# Patient Record
Sex: MALE | Race: BLACK OR AFRICAN AMERICAN | NOT HISPANIC OR LATINO | Employment: UNEMPLOYED | URBAN - METROPOLITAN AREA
[De-identification: names, ages, dates, MRNs, and addresses within clinical notes are randomized per-mention and may not be internally consistent; named-entity substitution may affect disease eponyms.]

---

## 2023-10-24 ENCOUNTER — APPOINTMENT (OUTPATIENT)
Dept: RADIOLOGY | Facility: HOSPITAL | Age: 63
End: 2023-10-24
Payer: MEDICAID

## 2023-10-24 ENCOUNTER — HOSPITAL ENCOUNTER (EMERGENCY)
Facility: HOSPITAL | Age: 63
Discharge: HOME | End: 2023-10-24
Payer: MEDICAID

## 2023-10-24 VITALS
SYSTOLIC BLOOD PRESSURE: 125 MMHG | RESPIRATION RATE: 18 BRPM | WEIGHT: 168.65 LBS | TEMPERATURE: 97.7 F | DIASTOLIC BLOOD PRESSURE: 85 MMHG | HEIGHT: 72 IN | HEART RATE: 67 BPM | OXYGEN SATURATION: 98 % | BODY MASS INDEX: 22.84 KG/M2

## 2023-10-24 DIAGNOSIS — S59.912A: Primary | ICD-10-CM

## 2023-10-24 PROCEDURE — 73090 X-RAY EXAM OF FOREARM: CPT | Mod: LEFT SIDE | Performed by: RADIOLOGY

## 2023-10-24 PROCEDURE — 99283 EMERGENCY DEPT VISIT LOW MDM: CPT | Mod: 25 | Performed by: REGISTERED NURSE

## 2023-10-24 PROCEDURE — 90715 TDAP VACCINE 7 YRS/> IM: CPT | Performed by: REGISTERED NURSE

## 2023-10-24 PROCEDURE — 73090 X-RAY EXAM OF FOREARM: CPT | Mod: LT,FY

## 2023-10-24 PROCEDURE — 90471 IMMUNIZATION ADMIN: CPT | Performed by: REGISTERED NURSE

## 2023-10-24 PROCEDURE — 2500000004 HC RX 250 GENERAL PHARMACY W/ HCPCS (ALT 636 FOR OP/ED): Performed by: REGISTERED NURSE

## 2023-10-24 PROCEDURE — 99284 EMERGENCY DEPT VISIT MOD MDM: CPT

## 2023-10-24 RX ORDER — DOXYCYCLINE 100 MG/1
100 CAPSULE ORAL 2 TIMES DAILY
Qty: 10 CAPSULE | Refills: 0 | Status: SHIPPED | OUTPATIENT
Start: 2023-10-24 | End: 2023-10-29

## 2023-10-24 RX ADMIN — TETANUS TOXOID, REDUCED DIPHTHERIA TOXOID AND ACELLULAR PERTUSSIS VACCINE, ADSORBED 0.5 ML: 5; 2.5; 8; 8; 2.5 SUSPENSION INTRAMUSCULAR at 17:12

## 2023-10-24 ASSESSMENT — LIFESTYLE VARIABLES
HAVE YOU EVER FELT YOU SHOULD CUT DOWN ON YOUR DRINKING: NO
REASON UNABLE TO ASSESS: YES
EVER HAD A DRINK FIRST THING IN THE MORNING TO STEADY YOUR NERVES TO GET RID OF A HANGOVER: NO
EVER FELT BAD OR GUILTY ABOUT YOUR DRINKING: NO
HAVE PEOPLE ANNOYED YOU BY CRITICIZING YOUR DRINKING: NO

## 2023-10-24 ASSESSMENT — PAIN - FUNCTIONAL ASSESSMENT: PAIN_FUNCTIONAL_ASSESSMENT: 0-10

## 2023-10-24 ASSESSMENT — COLUMBIA-SUICIDE SEVERITY RATING SCALE - C-SSRS
1. IN THE PAST MONTH, HAVE YOU WISHED YOU WERE DEAD OR WISHED YOU COULD GO TO SLEEP AND NOT WAKE UP?: NO
6. HAVE YOU EVER DONE ANYTHING, STARTED TO DO ANYTHING, OR PREPARED TO DO ANYTHING TO END YOUR LIFE?: NO
2. HAVE YOU ACTUALLY HAD ANY THOUGHTS OF KILLING YOURSELF?: NO

## 2023-10-24 ASSESSMENT — PAIN SCALES - GENERAL: PAINLEVEL_OUTOF10: 8

## 2023-10-24 NOTE — ED PROVIDER NOTES
"HPI   Chief Complaint   Patient presents with    Wound Check     \"I have a bamboo splinter in my left arm.  It broke off.'       62-year-old male who denies significant past medical history presents emergency department with report that a piece of bamboo broke off into his left forearm.  Patient tells me that approximately 2 PM today he was working out in his yard and somehow p.m. Mane stick penetrated his left forearm.  Patient states he attempted to pull it out himself however he feels that it broke off in his arm.  Patient is unsure of his last tetanus shot.  Patient denies any numbness or tingling, denies any fever or chills.  Additionally patient denies any chest pain, shortness of breath, nausea vomiting, abdominal pain, itchiness or lightheadedness, headache, weakness, urinary symptoms.        History provided by:  Patient   used: No                        [place] Coma Scale Score: 15                  Patient History   History reviewed. No pertinent past medical history.  History reviewed. No pertinent surgical history.  No family history on file.  Social History     Tobacco Use    Smoking status: Every Day     Types: Cigarettes    Smokeless tobacco: Never   Vaping Use    Vaping Use: Never used   Substance Use Topics    Alcohol use: Never    Drug use: Never     Physical Exam   ED Triage Vitals [10/24/23 1532]   Temp Heart Rate Resp BP   36.5 °C (97.7 °F) 67 18 125/85      SpO2 Temp Source Heart Rate Source Patient Position   98 % Tympanic Monitor Sitting      BP Location FiO2 (%)     -- --       Physical Exam    ED Course & MDM   Diagnoses as of 10/24/23 1901   Soft tissue injury of left forearm, initial encounter       Medical Decision Making  62-year-old male who denies significant past medical history presents emergency department with report that a piece of bamboo broke off into his left forearm.  Patient tells me that approximately 2 PM today he was working out in his yard and " somehow p.m. Mane stick penetrated his left forearm.  Patient states he attempted to pull it out himself however he feels that it broke off in his arm.  Patient is unsure of his last tetanus shot.  Patient denies any numbness or tingling, denies any fever or chills.  Additionally patient denies any chest pain, shortness of breath, nausea vomiting, abdominal pain, itchiness or lightheadedness, headache, weakness, urinary symptoms.  Patient seen examined emergency department; patient is healthy and nontoxic appearance does not appear in any acute distress.  Clinical exam significant for small raised area at distal end of the left forearm.  Patient does appear to have a possible penetration site and the area around it is swollen.  Will obtain imaging of left forearm to evaluate for foreign body.  We will also update patient's tetanus while he is here today.  Patient's imaging of left forearm did not display does retained foreign body.  I did go discussed this with patient.  Patient is adamantly screaming that there is a piece of bamboo in his arm.  We discussed that I was uncomfortable lancing in the area open just to explore.  We discussed the that the bamboo would work itself out.  I did start patient on oral antibiotics prophylactically.  Patient is visibly upset, bedside nurses present during discussion.  Patient discharged home in stable condition with prescription for antibiotics.        Procedure  Procedures     Calista Perry, SINDY-CNP  10/24/23 1902

## 2023-11-13 ENCOUNTER — APPOINTMENT (OUTPATIENT)
Dept: SURGERY | Facility: CLINIC | Age: 63
End: 2023-11-13
Payer: MEDICAID

## 2023-11-30 ENCOUNTER — OFFICE VISIT (OUTPATIENT)
Dept: SURGERY | Facility: CLINIC | Age: 63
End: 2023-11-30
Payer: MEDICAID

## 2023-11-30 VITALS
BODY MASS INDEX: 22.35 KG/M2 | DIASTOLIC BLOOD PRESSURE: 113 MMHG | HEART RATE: 73 BPM | HEIGHT: 72 IN | WEIGHT: 165 LBS | SYSTOLIC BLOOD PRESSURE: 148 MMHG

## 2023-11-30 DIAGNOSIS — M60.232: Primary | ICD-10-CM

## 2023-11-30 PROCEDURE — 99203 OFFICE O/P NEW LOW 30 MIN: CPT | Performed by: SURGERY

## 2023-11-30 ASSESSMENT — ENCOUNTER SYMPTOMS: WOUND: 1

## 2023-11-30 NOTE — PROGRESS NOTES
Subjective   Patient ID: Joshua Ferreira is a 63 y.o. male who presents for New Patient Visit.  HPI  63-year-old male suffered injury to left forearm from a piece of bamboo he states that still there was not removed in the ER soft tissue films negative some pain in the area but no neurologic deficits  Review of Systems   Skin:  Positive for rash and wound.   All other systems reviewed and are negative.      Objective   Physical Exam  Skin:     Comments: 1 x 1 cm nodule without purulence left forearm with some induration       Physical Exam  Constitutional:       Appearance: Normal appearance.   HENT:      Head: Normocephalic and atraumatic.      Mouth/Throat:      Pharynx: Oropharynx is clear.   Eyes:      Pupils: Pupils are equal, round, and reactive to light.   Cardiovascular:      Rate and Rhythm: Normal rate and regular rhythm.   Pulmonary:      Effort: Pulmonary effort is normal.      Breath sounds: Normal breath sounds.   Abdominal:      General: Abdomen is flat. Bowel sounds are normal.      Palpations: Abdomen is soft.   Musculoskeletal:         General: Normal range of motion.      Cervical back: Normal range of motion.   Skin:     General: Skin is warm.   Neurological:      General: No focal deficit present.      Mental Status: She is alert. Mental status is at baseline.   Psychiatric:         Mood and Affect: Mood normal.    Assessment/Plan   Foreign body left forearm discussed with the patient recommend exploration under local anesthetic in the office with removal need 45 minutes

## 2024-03-19 ENCOUNTER — HOSPITAL ENCOUNTER (EMERGENCY)
Facility: HOSPITAL | Age: 64
Discharge: OTHER INSTITUTION | End: 2024-03-20
Attending: INTERNAL MEDICINE
Payer: MEDICAID

## 2024-03-19 ENCOUNTER — APPOINTMENT (OUTPATIENT)
Dept: RADIOLOGY | Facility: HOSPITAL | Age: 64
End: 2024-03-19
Payer: MEDICAID

## 2024-03-19 DIAGNOSIS — S05.62XA: Primary | ICD-10-CM

## 2024-03-19 LAB
ANION GAP SERPL CALC-SCNC: 10 MMOL/L (ref 10–20)
BASOPHILS # BLD AUTO: 0.05 X10*3/UL (ref 0–0.1)
BASOPHILS NFR BLD AUTO: 0.8 %
BUN SERPL-MCNC: 12 MG/DL (ref 6–23)
CALCIUM SERPL-MCNC: 9 MG/DL (ref 8.6–10.3)
CHLORIDE SERPL-SCNC: 105 MMOL/L (ref 98–107)
CO2 SERPL-SCNC: 27 MMOL/L (ref 21–32)
CREAT SERPL-MCNC: 0.9 MG/DL (ref 0.5–1.3)
EGFRCR SERPLBLD CKD-EPI 2021: >90 ML/MIN/1.73M*2
EOSINOPHIL # BLD AUTO: 0.11 X10*3/UL (ref 0–0.7)
EOSINOPHIL NFR BLD AUTO: 1.7 %
ERYTHROCYTE [DISTWIDTH] IN BLOOD BY AUTOMATED COUNT: 13.9 % (ref 11.5–14.5)
GLUCOSE SERPL-MCNC: 85 MG/DL (ref 74–99)
HCT VFR BLD AUTO: 40.5 % (ref 41–52)
HGB BLD-MCNC: 13.7 G/DL (ref 13.5–17.5)
IMM GRANULOCYTES # BLD AUTO: 0.01 X10*3/UL (ref 0–0.7)
IMM GRANULOCYTES NFR BLD AUTO: 0.2 % (ref 0–0.9)
LYMPHOCYTES # BLD AUTO: 1.48 X10*3/UL (ref 1.2–4.8)
LYMPHOCYTES NFR BLD AUTO: 22.6 %
MCH RBC QN AUTO: 31 PG (ref 26–34)
MCHC RBC AUTO-ENTMCNC: 33.8 G/DL (ref 32–36)
MCV RBC AUTO: 92 FL (ref 80–100)
MONOCYTES # BLD AUTO: 0.46 X10*3/UL (ref 0.1–1)
MONOCYTES NFR BLD AUTO: 7 %
NEUTROPHILS # BLD AUTO: 4.45 X10*3/UL (ref 1.2–7.7)
NEUTROPHILS NFR BLD AUTO: 67.7 %
NRBC BLD-RTO: 0 /100 WBCS (ref 0–0)
PLATELET # BLD AUTO: 145 X10*3/UL (ref 150–450)
POTASSIUM SERPL-SCNC: 4 MMOL/L (ref 3.5–5.3)
RBC # BLD AUTO: 4.42 X10*6/UL (ref 4.5–5.9)
SODIUM SERPL-SCNC: 138 MMOL/L (ref 136–145)
WBC # BLD AUTO: 6.6 X10*3/UL (ref 4.4–11.3)

## 2024-03-19 PROCEDURE — 2500000004 HC RX 250 GENERAL PHARMACY W/ HCPCS (ALT 636 FOR OP/ED): Performed by: INTERNAL MEDICINE

## 2024-03-19 PROCEDURE — 36415 COLL VENOUS BLD VENIPUNCTURE: CPT | Performed by: PHYSICIAN ASSISTANT

## 2024-03-19 PROCEDURE — 99285 EMERGENCY DEPT VISIT HI MDM: CPT | Mod: 25

## 2024-03-19 PROCEDURE — 2500000001 HC RX 250 WO HCPCS SELF ADMINISTERED DRUGS (ALT 637 FOR MEDICARE OP): Performed by: INTERNAL MEDICINE

## 2024-03-19 PROCEDURE — 85025 COMPLETE CBC W/AUTO DIFF WBC: CPT | Performed by: PHYSICIAN ASSISTANT

## 2024-03-19 PROCEDURE — 80048 BASIC METABOLIC PNL TOTAL CA: CPT | Performed by: PHYSICIAN ASSISTANT

## 2024-03-19 PROCEDURE — 2500000001 HC RX 250 WO HCPCS SELF ADMINISTERED DRUGS (ALT 637 FOR MEDICARE OP): Performed by: PHYSICIAN ASSISTANT

## 2024-03-19 PROCEDURE — 96365 THER/PROPH/DIAG IV INF INIT: CPT | Mod: 59

## 2024-03-19 PROCEDURE — 70482 CT ORBIT/EAR/FOSSA W/O&W/DYE: CPT

## 2024-03-19 PROCEDURE — 2500000004 HC RX 250 GENERAL PHARMACY W/ HCPCS (ALT 636 FOR OP/ED)

## 2024-03-19 PROCEDURE — 2550000001 HC RX 255 CONTRASTS: Performed by: PHYSICIAN ASSISTANT

## 2024-03-19 PROCEDURE — 70482 CT ORBIT/EAR/FOSSA W/O&W/DYE: CPT | Performed by: RADIOLOGY

## 2024-03-19 RX ORDER — ACETAMINOPHEN 325 MG/1
650 TABLET ORAL ONCE
Status: COMPLETED | OUTPATIENT
Start: 2024-03-19 | End: 2024-03-19

## 2024-03-19 RX ORDER — TETRACAINE HYDROCHLORIDE 5 MG/ML
2 SOLUTION OPHTHALMIC ONCE
Status: COMPLETED | OUTPATIENT
Start: 2024-03-19 | End: 2024-03-19

## 2024-03-19 RX ORDER — CEFAZOLIN SODIUM 1 G/50ML
1 SOLUTION INTRAVENOUS ONCE
Status: COMPLETED | OUTPATIENT
Start: 2024-03-19 | End: 2024-03-19

## 2024-03-19 RX ORDER — OXYCODONE AND ACETAMINOPHEN 5; 325 MG/1; MG/1
1 TABLET ORAL ONCE
Status: COMPLETED | OUTPATIENT
Start: 2024-03-19 | End: 2024-03-19

## 2024-03-19 RX ADMIN — CEFAZOLIN SODIUM 1 G: 1 SOLUTION INTRAVENOUS at 23:19

## 2024-03-19 RX ADMIN — IOHEXOL 75 ML: 350 INJECTION, SOLUTION INTRAVENOUS at 20:21

## 2024-03-19 RX ADMIN — ACETAMINOPHEN 650 MG: 325 TABLET ORAL at 18:06

## 2024-03-19 RX ADMIN — TETRACAINE HYDROCHLORIDE 2 DROP: 5 SOLUTION OPHTHALMIC at 18:06

## 2024-03-19 RX ADMIN — FLUORESCEIN SODIUM 1 STRIP: 1 STRIP OPHTHALMIC at 18:06

## 2024-03-19 RX ADMIN — OXYCODONE HYDROCHLORIDE AND ACETAMINOPHEN 1 TABLET: 5; 325 TABLET ORAL at 22:53

## 2024-03-19 ASSESSMENT — PAIN DESCRIPTION - DESCRIPTORS: DESCRIPTORS: ACHING

## 2024-03-19 ASSESSMENT — PAIN DESCRIPTION - LOCATION: LOCATION: EYE

## 2024-03-19 ASSESSMENT — LIFESTYLE VARIABLES
EVER FELT BAD OR GUILTY ABOUT YOUR DRINKING: NO
HAVE YOU EVER FELT YOU SHOULD CUT DOWN ON YOUR DRINKING: NO
EVER HAD A DRINK FIRST THING IN THE MORNING TO STEADY YOUR NERVES TO GET RID OF A HANGOVER: NO
HAVE PEOPLE ANNOYED YOU BY CRITICIZING YOUR DRINKING: NO

## 2024-03-19 ASSESSMENT — VISUAL ACUITY
OD: 20/40
OS: 20/30

## 2024-03-19 ASSESSMENT — PAIN DESCRIPTION - PAIN TYPE: TYPE: ACUTE PAIN

## 2024-03-19 ASSESSMENT — COLUMBIA-SUICIDE SEVERITY RATING SCALE - C-SSRS
2. HAVE YOU ACTUALLY HAD ANY THOUGHTS OF KILLING YOURSELF?: NO
1. IN THE PAST MONTH, HAVE YOU WISHED YOU WERE DEAD OR WISHED YOU COULD GO TO SLEEP AND NOT WAKE UP?: NO
6. HAVE YOU EVER DONE ANYTHING, STARTED TO DO ANYTHING, OR PREPARED TO DO ANYTHING TO END YOUR LIFE?: NO

## 2024-03-19 ASSESSMENT — PAIN DESCRIPTION - ONSET: ONSET: SUDDEN

## 2024-03-19 ASSESSMENT — PAIN DESCRIPTION - ORIENTATION: ORIENTATION: LEFT

## 2024-03-19 ASSESSMENT — PAIN DESCRIPTION - PROGRESSION: CLINICAL_PROGRESSION: NOT CHANGED

## 2024-03-19 ASSESSMENT — PAIN SCALES - GENERAL: PAINLEVEL_OUTOF10: 7

## 2024-03-19 ASSESSMENT — PAIN - FUNCTIONAL ASSESSMENT: PAIN_FUNCTIONAL_ASSESSMENT: 0-10

## 2024-03-19 ASSESSMENT — PAIN DESCRIPTION - FREQUENCY: FREQUENCY: CONSTANT/CONTINUOUS

## 2024-03-19 NOTE — ED PROVIDER NOTES
HPI   Chief Complaint   Patient presents with    Eye Trauma     Left eye injury        63-year-old male patient comes in the emergency department today after a stick stabbed through the corner of his left eye while working out.  He states now he has blurry vision and irritation of the eye as well as a cut to his eyelid.  He states he had to pull this bamboo stick out.  States his tetanus shot is up-to-date.  Rates pain a 7 out of 10 on the pain scale.  Otherwise has no other complaints this present time.  For this purpose he comes to the emergency department today by EMS.                          [place] Coma Scale Score: 15                     Patient History   History reviewed. No pertinent past medical history.  History reviewed. No pertinent surgical history.  No family history on file.  Social History     Tobacco Use    Smoking status: Every Day     Packs/day: 1     Types: Cigarettes    Smokeless tobacco: Never   Vaping Use    Vaping Use: Never used   Substance Use Topics    Alcohol use: Never    Drug use: Never       Physical Exam   ED Triage Vitals [03/19/24 1622]   Temperature Heart Rate Respirations BP   36.8 °C (98.2 °F) 72 20 150/85      Pulse Ox Temp Source Heart Rate Source Patient Position   97 % Temporal Monitor Sitting      BP Location FiO2 (%)     Right arm --       Physical Exam  Constitutional:       General: He is in acute distress (Moderate distress).      Appearance: Normal appearance.   HENT:      Head: Normocephalic and atraumatic.      Comments: Patient has a laceration in a vertical fashion to the medial aspect of the left upper eyelid     Nose: Nose normal.   Eyes:      Extraocular Movements: Extraocular movements intact.      Pupils: Pupils are equal, round, and reactive to light.      Comments: Conjunctival injection of the left eye, tetracaine applied, fluorescein eye visualized under Benjamin lamp.  Brandon sign is negative.  No corneal abrasion or ulcerations.   Cardiovascular:      Rate  and Rhythm: Normal rate and regular rhythm.   Pulmonary:      Effort: Pulmonary effort is normal. No respiratory distress.      Breath sounds: Normal breath sounds. No stridor. No wheezing.   Musculoskeletal:         General: Normal range of motion.      Cervical back: Normal range of motion.   Skin:     General: Skin is warm and dry.   Neurological:      General: No focal deficit present.      Mental Status: He is alert and oriented to person, place, and time. Mental status is at baseline.   Psychiatric:         Mood and Affect: Mood normal.         ED Course & MDM   Diagnoses as of 03/19/24 2018   Eye injury, penetrating, left, initial encounter       Medical Decision Making  63-year-old male patient comes in the emergency department today after a stick stabbed through the corner of his left eye while working out.  He states now he has blurry vision and irritation of the eye as well as a cut to his eyelid.  He states he had to pull this bamboo stick out.  States his tetanus shot is up-to-date.  Rates pain a 7 out of 10 on the pain scale.  Otherwise has no other complaints this present time.  For this purpose he comes to the emergency department today by EMS.    CT study of the orbits ordered with and without contrast to check for tracking, hematoma, gas introduction.  Laboratory studies ordered to rule leukocytosis left shift as well as normal renal function prior to getting CT IV dye.  Patient wanted to p.o. Tylenol for his pain which is ordered.  Also ordered tetracaine fluorescein to check for corneal abrasions.    Handoff to Cristobal Enamorado PA-C pending CT results, consult with ophthalmology.        Labs Reviewed   CBC WITH AUTO DIFFERENTIAL - Abnormal       Result Value    WBC 6.6      nRBC 0.0      RBC 4.42 (*)     Hemoglobin 13.7      Hematocrit 40.5 (*)     MCV 92      MCH 31.0      MCHC 33.8      RDW 13.9      Platelets 145 (*)     Neutrophils % 67.7      Immature Granulocytes %, Automated 0.2       Lymphocytes % 22.6      Monocytes % 7.0      Eosinophils % 1.7      Basophils % 0.8      Neutrophils Absolute 4.45      Immature Granulocytes Absolute, Automated 0.01      Lymphocytes Absolute 1.48      Monocytes Absolute 0.46      Eosinophils Absolute 0.11      Basophils Absolute 0.05     BASIC METABOLIC PANEL - Normal    Glucose 85      Sodium 138      Potassium 4.0      Chloride 105      Bicarbonate 27      Anion Gap 10      Urea Nitrogen 12      Creatinine 0.90      eGFR >90      Calcium 9.0          CT orbit w IV contrast    (Results Pending)         Procedure  Procedures     Demetrio Denise PA-C  03/19/24 2018

## 2024-03-20 ENCOUNTER — HOSPITAL ENCOUNTER (EMERGENCY)
Facility: HOSPITAL | Age: 64
Discharge: HOME | End: 2024-03-20
Attending: EMERGENCY MEDICINE
Payer: MEDICAID

## 2024-03-20 VITALS
BODY MASS INDEX: 23.03 KG/M2 | HEART RATE: 54 BPM | SYSTOLIC BLOOD PRESSURE: 132 MMHG | TEMPERATURE: 98.2 F | RESPIRATION RATE: 20 BRPM | WEIGHT: 170 LBS | DIASTOLIC BLOOD PRESSURE: 81 MMHG | HEIGHT: 72 IN | OXYGEN SATURATION: 97 %

## 2024-03-20 VITALS
RESPIRATION RATE: 18 BRPM | DIASTOLIC BLOOD PRESSURE: 88 MMHG | OXYGEN SATURATION: 99 % | WEIGHT: 170 LBS | HEIGHT: 72 IN | SYSTOLIC BLOOD PRESSURE: 135 MMHG | BODY MASS INDEX: 23.03 KG/M2 | HEART RATE: 85 BPM | TEMPERATURE: 97.7 F

## 2024-03-20 DIAGNOSIS — S05.32XA EYE LACERATION, LEFT, INITIAL ENCOUNTER: Primary | ICD-10-CM

## 2024-03-20 PROCEDURE — 99284 EMERGENCY DEPT VISIT MOD MDM: CPT | Performed by: EMERGENCY MEDICINE

## 2024-03-20 PROCEDURE — 99284 EMERGENCY DEPT VISIT MOD MDM: CPT

## 2024-03-20 RX ORDER — MOXIFLOXACIN 5 MG/ML
1 SOLUTION/ DROPS OPHTHALMIC 4 TIMES DAILY
Qty: 3 ML | Refills: 0 | Status: SHIPPED | OUTPATIENT
Start: 2024-03-20 | End: 2024-03-30

## 2024-03-20 RX ORDER — ACETAMINOPHEN 325 MG/1
650 TABLET ORAL EVERY 6 HOURS PRN
Qty: 30 TABLET | Refills: 0 | Status: SHIPPED | OUTPATIENT
Start: 2024-03-20

## 2024-03-20 RX ORDER — NAPROXEN 500 MG/1
500 TABLET ORAL 2 TIMES DAILY
Qty: 20 TABLET | Refills: 0 | Status: SHIPPED | OUTPATIENT
Start: 2024-03-20

## 2024-03-20 RX ORDER — ERYTHROMYCIN 5 MG/G
OINTMENT OPHTHALMIC 4 TIMES DAILY
Qty: 3.5 G | Refills: 0 | Status: SHIPPED | OUTPATIENT
Start: 2024-03-20 | End: 2024-03-30

## 2024-03-20 RX ORDER — CEPHALEXIN 500 MG/1
500 CAPSULE ORAL 4 TIMES DAILY
Qty: 40 CAPSULE | Refills: 0 | Status: SHIPPED | OUTPATIENT
Start: 2024-03-20 | End: 2024-03-30

## 2024-03-20 ASSESSMENT — PAIN - FUNCTIONAL ASSESSMENT: PAIN_FUNCTIONAL_ASSESSMENT: 0-10

## 2024-03-20 ASSESSMENT — PAIN SCALES - GENERAL
PAINLEVEL_OUTOF10: 9
PAINLEVEL_OUTOF10: 8

## 2024-03-20 ASSESSMENT — COLUMBIA-SUICIDE SEVERITY RATING SCALE - C-SSRS
2. HAVE YOU ACTUALLY HAD ANY THOUGHTS OF KILLING YOURSELF?: NO
6. HAVE YOU EVER DONE ANYTHING, STARTED TO DO ANYTHING, OR PREPARED TO DO ANYTHING TO END YOUR LIFE?: NO
1. IN THE PAST MONTH, HAVE YOU WISHED YOU WERE DEAD OR WISHED YOU COULD GO TO SLEEP AND NOT WAKE UP?: NO

## 2024-03-20 NOTE — DISCHARGE INSTRUCTIONS
Please take all medications/eyedrops as prescribed and follow-up with ophthalmology had recommended appointment.  If you have any acute worsening of vision please return to the emergency department.

## 2024-03-20 NOTE — PROGRESS NOTES
Received signout regarding this patient  EOMI without nystagmus  Diffuse conjunctival injection of left eye with laceration over medial left eyelid.  The the laceration is not through and through.  No hyphema or hypopnea    I performed Shawn-Pen examination which is listed in the ED course.  His CT shows soft tissue hemorrhage in medial left orbital soft tissues with evidence of extension of air, edema, and hemorrhage in the postseptal Soft tissue the orbit compatible with penetrating injury.  Question of subtle fracture of anterior aspect of left orbit medial wall.  Patient was started on Ancef in the ED. reaction to penicillin is itching.  No concerning findings for orbital compartment syndrome.    Spoke with ophthalmology  at [facility] who accepts admission of this patient.  Discussed with Dr. Colt CHIN at [facility] who accepts admission of this patient.  Patient will be transferred via BLS to [facility].

## 2024-03-20 NOTE — ED NOTES
Ride set up through insurance. NATI Dahl, escorted pt to [facility] to meet ride.     Eddie Willard, DANIAL  03/20/24 0808

## 2024-03-20 NOTE — CONSULTS
History of Present Illness:  This is a 62 y/o M with hx of primary open angle glaucoma (POAG) right eye > left eye (follows with Dr. Sainz at [facility]) and history of corneal foreign body (FB) removal left eye who presents as transfer from [facility] for trauma to left eye. Patient states that around 3:30 PM on 3/19/24, he was working out vigorously at home doing a form of push-ups when he thrust his head and left eye down and directly into a bamboo plant. By the time he recoiled back, the bamboo plant had come out from where it had penetrated. He felt an immediate black out of vision in his left eye when this happened which returned a second or two later. The pain was initially a 10/10 that has now become a constant, sharp 7/10, worse with eye movements or palpation of the upper lid. Patient immediately put a cold wash cloth over the left eye and went to [facility], where he received a dose of Ancef. Aside from pain, he complains of tearing from the left eye. He denies any flashes, floaters, blurry vision or photophobia.       ROS: Patient denies discharge, decreased vision, double vision, blind spots, flashes, or floaters. All other systems have been reviewed and are negative.    PMHx: please refer to admission HPI  Medications: please refer to medication reconciliation  Allergies: please refer to patient allergy list  Past Ocular History: as per above HPI  Family History: reviewed and noncontributory to chief ophthalmic complaint  Orientation: Alert and oriented x3, appropriate mood and behavior    Examination:     Base Eye Exam       Visual Acuity (Snellen - Linear)         Right Left    Near cc 20/20-3 20/30 (patient refused to continue reading or pinhole)              Tonometry (Tonopen, 6:23 AM)         Right Left    Pressure 18 20              Pupils         Dark Light Shape APD    Right 3 2 brisk None    Left 3 2 brisk None              Visual Fields         Left Right     Full Full              Extraocular  Movement         Right Left     Full Full   Pain left eye in all fields of gaze, most notable abduction             Dilation       1% Mydriacyl & 2.5% Sorin  @ 6:22 AM                  Additional Tests       Color         Right Left     refused refused                  Slit Lamp and Fundus Exam       External Exam         Right Left    External Normal upper eyelid scabbing              Slit Lamp Exam         Right Left    Lids/Lashes Normal 5 mm non-margin involving upper lid medial laceration within lid crease without obvious involvement of canalicular system    Conjunctiva/Sclera melanosis conjunctival laceration 10 o clock - 1 o clock; no underllying globe penetration/compromise; patchy, flat YAKOV in all quadrants    Cornea arcus, 3+ inferior SPK arcus, 1+ inferior SPK    Anterior Chamber Deep and quiet Deep and quiet    Iris Round and reactive Round and reactive    Lens 2+ NS 2+ NS    Anterior Vitreous Normal Normal              Fundus Exam         Right Left    Disc Normal Normal    C/D Ratio 0.8 0.8    Macula Normal Normal    Vessels Normal Normal    Periphery grossly Normal grossly Normal                    Imaging:  CT orbit w and wo contrast 3/19/24:  Soft tissue hemorrhage in the medial left orbital soft tissues with  evidence of extension of air, edema and hemorrhage in the postseptal  soft tissues of the orbit compatible with penetrating injury into the  postseptal soft tissues. Multiple small vessels are noted in the  region of the penetrating trauma. There is question of subtle  fracture of anterior aspect of left orbit medial wall/lamina  papyracea.    Assessment and Plan:  #Left non-margin involving upper eyelid laceration  #Left superior conjunctival laceration  - 62 y/o M with hx of primary open angle glaucoma (POAG) and corneal foreign body (FB) removal left eye [place] injury to left eye at 3:30 PM on 3/19/24  - Patient intolerant of entrance testing and  refused to continue assessment of visual acuity, however, prior to this, had read 20/30 line left eye. No relative afferent pupillary defect (RAPD), intraocular pressure (IOP) 18 right eye, 20 left eye. EOMS were full but with pain in abduction left eye  - Exam of left eye notable for 5 mm non-margin involving medial laceration of upper lid within lid crease, approaching canalicular system with no obvious involvement; patchy YAKOV in all quadrants; conjunctival laceration from 10 - 1 o clock without underlying globe compromise  - Attempted bedside probing and irrigation of left upper canalicular system, however, the left upper punctum is completely stenotic and thus was not able to use the dilator or enter punctum with irrigating cannula  - Lid laceration thoroughly cleaned at bedside with sterile saline and betadine; erythromycin daja used over the wounds following cleaning  - With regard to conjunctival laceration, this will heal on its own with lubrication and antibiotic drops to prevent infection  - With regard to non margin involving eyelid laceration, discussed with patient that it is small and superficial. Because the laceration conforms to his natural lid crease, it is well opposed and has already started to show signs of healing. Offered patient option of repairing with several sutures at bedside vs closure by secondary intention. This may also be beneficial because of the dirty nature of the wound. Patient elects to defer suture closure at this time. Explained to patient that should there be an increase in pain or brown/yellow discharge from the eye or wound site, this would constitute alarm symptoms and would suggest returning to ED or eye provider for immediate evaluation  - Treat lacerations as below    #Primary open angle glaucoma, both eyes  - Last saw Dr. Sainz [facility] in December 2022. At that time, wanted to plan for trabeculectomy of right eye. Per Dr. Sainz's note, issues with compliance with treatment  plans  - Patient previously on intraocular pressure (IOP) lowering drops but self-dc'ed due to intolerance  - Recommend considering initiation of intraocular pressure (IOP)-lowering drops at follow up appointment for lid/conjunctival laceration      Plan:  - Start erythromycin daja QID to the eye AND to the medial skin laceration QID  - Start moxifloxacin QID to left eye for 1 week then stop  - Start oral antibiotics; defer choice to ED but can consider cephalexin 250 to 500 mg QID for 7-10 days  - We will arrange follow up with ophthalmology (Carolee basurto )        Alireza Melo MD  Ophthalmology PGY-2      Ophthalmology Adult Pager - [phone]  Ophthalmology Pediatrics Pager - [phone]    For adult follow-up appointments, call: [phone]  For pediatric follow-up appointments, call: [phone]      NOTE: This note is not finalized until attending reviews and signs.

## 2024-03-20 NOTE — ED TRIAGE NOTES
Pt is a transfer from [place] ed for optho consult. Pt has injury to right eye.  Accidental penetrating type injury with wooden stick. Incident occurred at about 3pm yesterday. Pt co pain to this eye. Denies vision problems with eye.

## 2024-03-20 NOTE — ED PROVIDER NOTES
CC: Eye Trauma     HPI:  Patient is a 63-year-old male with past medical history significant for open angle glaucoma of the right eye greater than left eye presenting from [facility] after sustaining a stab wound from a bamboo pole to the left medial canthi.  Patient was transferred for ophthalmology evaluation.  I evaluated the patient at bedside and he is reporting minimal pain at this time, improvement in vision since initial evaluation.  Patient not reporting any additional trauma from this episode reports that he was attempting to work out with this bamboo staff.  Patient endorsing full fields of vision,, no flashes, no floaters.    Records Reviewed:  Recent available ED and inpatient notes reviewed in EMR.    PMHx/PSHx:  Per HPI.   - has no past medical history on file.  - has no past surgical history on file.    Medications:  Reviewed in EMR. See EMR for complete list of medications and doses.    Allergies:  Penicillins    Social History:  - Tobacco:  reports that he has been smoking cigarettes. He has been smoking an average of 1 pack per day. He has never used smokeless tobacco.   - Alcohol:  reports no history of alcohol use.   - Illicit Drugs:  reports no history of drug use.     ROS:  Per HPI.       ???????????????????????????????????????????????????????????????  Triage Vitals:  T 37.1 °C (98.8 °F)  HR 56  /84  RR 18  O2 96 % None (Room air)    Physical Exam  Vitals and nursing note reviewed.   Constitutional:       Appearance: Normal appearance.   HENT:      Head: Normocephalic and atraumatic.      Nose: Nose normal.      Mouth/Throat:      Pharynx: Oropharynx is clear.   Eyes:      Extraocular Movements: Extraocular movements intact.      Pupils: Pupils are equal, round, and reactive to light.        Comments: Laceration and tearing in the area noted, no active bleeding.     Cardiovascular:      Rate and Rhythm: Normal rate and regular rhythm.      Pulses: Normal pulses.    Pulmonary:      Effort: Pulmonary effort is normal.      Breath sounds: Normal breath sounds.   Abdominal:      General: There is no distension.      Tenderness: There is no abdominal tenderness. There is no right CVA tenderness, left CVA tenderness or guarding.   Musculoskeletal:         General: No swelling or deformity. Normal range of motion.      Cervical back: Normal range of motion.      Right lower leg: No edema.      Left lower leg: No edema.   Skin:     General: Skin is warm and dry.      Capillary Refill: Capillary refill takes less than 2 seconds.   Neurological:      General: No focal deficit present.      Mental Status: He is alert and oriented to person, place, and time.   Psychiatric:         Mood and Affect: Mood normal.         Behavior: Behavior normal.       ???????????????????????????????????????????????????????????????    Assessment and Plan:  Patient is a 63-year-old male past medical history as noted above presenting to the emergency department with a chief concern of stab wound to the left medial canthi.  Patient with no significant changes in vision and actually reporting improvement in vision since initial evaluation at [facility].  After my initial evaluation and confirmation of hemodynamic stability patient was immediately referred to ophthalmology and this consult was placed.  Ophthalmology evaluated the patient in the emergency department, discussed treatment options with the patient including repair versus conservative management.  Patient opted for conservative management, multiple antibiotics and eyedrops are recommended by the ophthalmology service and these are prescribed to the patient's pharmacy of choice.  Patient is recommended to follow-up with ophthalmology.  Ophthalmology will reach out to patient to schedule follow-up.  Patient with no acute threat to vision, no additional concerns at this time, appropriate for discharge from the emergency department.  Patient  prescription provided and the patient is discharged home emergency department stable condition.  No additional labs or imaging indicated at this time due to complete workup at outside hospital that is reviewed.    ED Course:  Diagnoses as of 03/20/24 0722   Eye laceration, left, initial encounter        Social Determinants Limiting Care:      Disposition:  Discharge    Papi Albarado MD       Procedures ? SmartLinks last updated 3/20/2024 7:22 AM     Attending Note:  The patient was seen by the resident/fellow/SIMONE.  I have personally performed a substantive portion of the encounter.  I have seen and examined the patient; agree with the workup, evaluation, MDM, management and diagnosis with the exception/addition of the following.  The care plan has been discussed with the resident/fellow; I have reviewed the resident/fellow’s note and agree with the documented findings with the exception/addition of the following:       HPI:   Joshua Ferreira is a 63 year old male with history per chart review, of glaucoma, anxiety, bipolar disorder, and GERD presenting to the ED for evaluation. The patient reports that this evening, he was working out, bent over and struck the medial aspect of his right eye with a bamboo stick. He reports blurred vision and a wound to the medial aspect of his left eyelid.  He pulled the portion of the bamboo pulled out when he recoiled back.  He reports last tetanus up to date and within 5 years. He presented to [facility] where he was given Ancef which was tolerated without difficulty. CT orbits demonstrated soft tissue hemorrhage in medial left orbital soft tissue with extension of air, edema and hemorrhage in the postseptal soft tissue, questionable subtle fracture of left medial orbital wall. He reports tearing and pain from the left eye, but denies any photophobia, flashes of light or floaters. He denies contact lens use. He was transferred to [facility] for ophthalmology evaluation.     Additional  History Obtained from: See HPI       Physical Exam:  General: Grossly well-developed, awake, alert, NAD    HEENT: Pupils approximately 3 mm --> 2 mm OU and briskly reactive. Left conjunctival injection. VF grossly intact.Approximately 5 mm upper lid laceration on the left eye.  Otherwise normocephalic, no overt external signs of trauma. Mucus membranes moist, nares patent    Neck: Supple, trachea midline    Neurologic: A&Ox4, RANDOLPH x 4 with grossly symmetric strength, 5/5, SILT grossly intact BUE and BLE    Cardiovascular: RRR, pulses grossly symmetric    Respiratory: CTA B/L, no wheeze, rales or rhonchi    Abdomen: Soft, not appreciably tender, no evident rebound/guarding    Back: No apparent CVA TTP    MSK: No gross bony deformities in BUE and BLE    Skin/Integumentary: Warm and dry    Psychiatric: Calm and cooperative. Normal mood and affect.      Differential Diagnoses Considered:  See MDM below    Chronic Medical Conditions Significantly Affecting Care:  See MDM below    External Records Reviewed:  I reviewed recent and relevant outside records as noted in HPI above and MDM below.   Social Determinants of Health Significantly Affecting Care:  See HPI/MDM    Prescription Drug Consideration:  See MDM    Diagnostic testing considered:  See MDM and relevant sections      Medical Decision Making/Course:  63 year old male with history per chart review, of glaucoma, anxiety, bipolar disorder, and GERD presenting to the ED for evaluation following foreign body to the left orbital region. Ophthalmology consulted on arrival. The patient was found to have a lid laceration and conjunctival laceration. Per evaluation, no APD. IOP 18 right eye, 20 left eye, EOMI. Opthalmology evaluated patient for involvement of canalicular system, no obvious involvement.  Opthalomology discussed with patient closure of lid laceration, however, patient elected to defer suture closure, and lid already showing signs of healing. Recommended  erythromycin ophthalmic ointment, oxifloxacin eye drops and oral keflex. Patient to follow-up closely with ophthalmology, and they reported they will help arrange follow-up. Patient was comfortable with plan. Strict warning signs/precautions for return to the ED were discussed.  Instructed to follow-up with ophthalmology and PCP/FP/IM clinic.  The patient was instructed to follow-up as discussed.  All available ED results from this visit were discussed. Any new prescription medications from the ED were discussed including common side effects/instructions for use. They were also instructed to return to the ED immediately if symptoms worsened, failed to improve, or if they developed any new symptoms/concerns.  An opportunity to ask questions was provided and all were addressed at that time.  The patient verbalized understanding and was in agreement with plan.          Papi Albarado MD  Resident  03/22/24 [id number]